# Patient Record
Sex: FEMALE | Race: OTHER | NOT HISPANIC OR LATINO
[De-identification: names, ages, dates, MRNs, and addresses within clinical notes are randomized per-mention and may not be internally consistent; named-entity substitution may affect disease eponyms.]

---

## 2023-03-28 PROBLEM — Z00.00 ENCOUNTER FOR PREVENTIVE HEALTH EXAMINATION: Status: ACTIVE | Noted: 2023-03-28

## 2023-03-30 ENCOUNTER — APPOINTMENT (OUTPATIENT)
Dept: ORTHOPEDIC SURGERY | Facility: CLINIC | Age: 53
End: 2023-03-30
Payer: COMMERCIAL

## 2023-03-30 PROCEDURE — 73562 X-RAY EXAM OF KNEE 3: CPT | Mod: LT

## 2023-03-30 PROCEDURE — 99204 OFFICE O/P NEW MOD 45 MIN: CPT

## 2023-03-30 RX ORDER — MELOXICAM 15 MG/1
15 TABLET ORAL
Qty: 30 | Refills: 1 | Status: ACTIVE | COMMUNITY
Start: 2023-03-30 | End: 1900-01-01
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
## 2023-04-28 ENCOUNTER — APPOINTMENT (OUTPATIENT)
Dept: ORTHOPEDIC SURGERY | Facility: CLINIC | Age: 53
End: 2023-04-28
Payer: COMMERCIAL

## 2023-04-28 PROCEDURE — 99214 OFFICE O/P EST MOD 30 MIN: CPT
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
## 2023-04-28 NOTE — PHYSICAL EXAM
[de-identified] : General: No acute distress, conversant, well-nourished.\par Head: Normocephalic, atraumatic\par Neck: trachea midline, FROM\par Heart: normotensive and normal rate and rhythm\par Lungs: No labored breathing\par Skin: No abrasions, no rashes, no edema\par Psych: Alert and oriented to person, place and time\par Extremities: no peripheral edema or digital cyanosis\par Gait: Normal gait. Can perform tandem gait.  \par Vascular: warm and well perfused distally, palpable distal pulses\par \par Left Knee with no erythema, no effusion.  \par No tenderness to palpation of knee.\par No medial joint line tenderness.\par No lateral joint line tenderness.\par \par Range of motion: 0 - 130 degrees\par No pain with range of motion.\par \par Negative Nusrat's test.\par Stable to varus and valgus stress.\par Negative Lachman's test, negative anterior and posterior drawer tests.  \par \par Sensation intact to light touch.  \par Normal motor exam.\par Warm and well perfused distally. [de-identified] : MRI 1-20-23 : There is a grade 1 injury of the MCL. LCL is unremarkable. There is no Fx bone bruise. Quadriceps and patellar tendons are intact. There is a contusion over the proximal patellar tendon. \par PCL is unremarkable, There is mild diffuse disorganization of the fibers of the ACL. \par Proximal fibula is unremarkable. Sublet subchondral bone bruising is seen anteriorly noted on image 14 of series 8. \par Medial Meniscus is unremarkable. Globular tear is seen perirenal at the anterior horn of the lateral meniscus. There is no baker's cyst. \par \par Left knee 3 view radiographs (3/30/23) no fracture or dislocation. Mild age-related degenerative changes.
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
## 2023-04-28 NOTE — REASON FOR VISIT
[Follow-Up Visit] : a follow-up visit for [Knee Pain] : knee pain [FreeTextEntry2] : PT with relief

## 2023-05-26 ENCOUNTER — APPOINTMENT (OUTPATIENT)
Dept: ORTHOPEDIC SURGERY | Facility: CLINIC | Age: 53
End: 2023-05-26
Payer: COMMERCIAL

## 2023-05-26 PROCEDURE — 99214 OFFICE O/P EST MOD 30 MIN: CPT

## 2023-06-04 NOTE — PHYSICAL EXAM
[de-identified] : General: No acute distress, conversant, well-nourished.\par Head: Normocephalic, atraumatic\par Neck: trachea midline, FROM\par Heart: normotensive and normal rate and rhythm\par Lungs: No labored breathing\par Skin: No abrasions, no rashes, no edema\par Psych: Alert and oriented to person, place and time\par Extremities: no peripheral edema or digital cyanosis\par Gait: Normal gait. Can perform tandem gait.  \par Vascular: warm and well perfused distally, palpable distal pulses\par \par Left Knee with no erythema, no effusion.  \par No tenderness to palpation of knee.\par No medial joint line tenderness.\par No lateral joint line tenderness.\par \par Range of motion: 0 - 130 degrees\par No pain with range of motion.\par \par Negative Nusrat's test.\par Stable to varus and valgus stress.\par Negative Lachman's test, negative anterior and posterior drawer tests.  \par \par Sensation intact to light touch.  \par Normal motor exam.\par Warm and well perfused distally. [de-identified] : MRI 1-20-23 : There is a grade 1 injury of the MCL. LCL is unremarkable. There is no Fx bone bruise. Quadriceps and patellar tendons are intact. There is a contusion over the proximal patellar tendon. \par PCL is unremarkable, There is mild diffuse disorganization of the fibers of the ACL. \par Proximal fibula is unremarkable. Sublet subchondral bone bruising is seen anteriorly noted on image 14 of series 8. \par Medial Meniscus is unremarkable. Globular tear is seen perirenal at the anterior horn of the lateral meniscus. There is no baker's cyst. \par \par Left knee 3 view radiographs (3/30/23) no fracture or dislocation. Mild age-related degenerative changes.
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
## 2023-06-23 ENCOUNTER — APPOINTMENT (OUTPATIENT)
Dept: ORTHOPEDIC SURGERY | Facility: CLINIC | Age: 53
End: 2023-06-23
Payer: COMMERCIAL

## 2023-06-23 PROCEDURE — 99214 OFFICE O/P EST MOD 30 MIN: CPT

## 2023-06-25 NOTE — PHYSICAL EXAM
[de-identified] : General: No acute distress, conversant, well-nourished.\par Head: Normocephalic, atraumatic\par Neck: trachea midline, FROM\par Heart: normotensive and normal rate and rhythm\par Lungs: No labored breathing\par Skin: No abrasions, no rashes, no edema\par Psych: Alert and oriented to person, place and time\par Extremities: no peripheral edema or digital cyanosis\par Gait: Normal gait. Can perform tandem gait.  \par Vascular: warm and well perfused distally, palpable distal pulses\par \par Left Knee with no erythema, no effusion.  \par No tenderness to palpation of knee.\par No medial joint line tenderness.\par No lateral joint line tenderness.\par \par Range of motion: 0 - 130 degrees\par No pain with range of motion.\par \par Negative Nusrat's test.\par Stable to varus and valgus stress.\par Negative Lachman's test, negative anterior and posterior drawer tests.  \par \par Sensation intact to light touch.  \par Normal motor exam.\par Warm and well perfused distally. [de-identified] : MRI 1-20-23 : There is a grade 1 injury of the MCL. LCL is unremarkable. There is no Fx bone bruise. Quadriceps and patellar tendons are intact. There is a contusion over the proximal patellar tendon. \par PCL is unremarkable, There is mild diffuse disorganization of the fibers of the ACL. \par Proximal fibula is unremarkable. Sublet subchondral bone bruising is seen anteriorly noted on image 14 of series 8. \par Medial Meniscus is unremarkable. Globular tear is seen perirenal at the anterior horn of the lateral meniscus. There is no baker's cyst. \par \par Left knee 3 view radiographs (3/30/23) no fracture or dislocation. Mild age-related degenerative changes.
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
## 2023-08-04 ENCOUNTER — APPOINTMENT (OUTPATIENT)
Dept: PHYSICAL MEDICINE AND REHAB | Facility: CLINIC | Age: 53
End: 2023-08-04

## 2023-08-09 ENCOUNTER — APPOINTMENT (OUTPATIENT)
Dept: ORTHOPEDIC SURGERY | Facility: CLINIC | Age: 53
End: 2023-08-09
Payer: COMMERCIAL

## 2023-08-09 DIAGNOSIS — M25.562 PAIN IN LEFT KNEE: ICD-10-CM

## 2023-08-09 PROCEDURE — 99214 OFFICE O/P EST MOD 30 MIN: CPT | Mod: 25

## 2023-08-09 PROCEDURE — 20610 DRAIN/INJ JOINT/BURSA W/O US: CPT | Mod: LT
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
## 2023-08-28 NOTE — PROCEDURE
[de-identified] : Procedure: left knee Joint injection with corticosteroid Pre-Procedure Diagnosis: left knee pain Post-Procedure Diagnosis: same  The patient was educated about the risks and benefits of a corticosteroid injection.  Alternatives were discussed.  The patient understood and consented for the procedure.  The area was sterilely prepped using isopropyl alcohol.  An ethyl chloride spray provided  local anesthesia.  Using the usual sterile technique, 1 ml of 40mg/1ml of Kenalog and 4 ml of Lidocaine 1% without epinephrine was injected into the joint.  A dressing was applied to the area.  The patient tolerated the procedure well and without complication.

## 2023-08-28 NOTE — PHYSICAL EXAM
[de-identified] : General: No acute distress, conversant, well-nourished.\par  Head: Normocephalic, atraumatic\par  Neck: trachea midline, FROM\par  Heart: normotensive and normal rate and rhythm\par  Lungs: No labored breathing\par  Skin: No abrasions, no rashes, no edema\par  Psych: Alert and oriented to person, place and time\par  Extremities: no peripheral edema or digital cyanosis\par  Gait: Normal gait. Can perform tandem gait.  \par  Vascular: warm and well perfused distally, palpable distal pulses\par  \par  Left Knee with no erythema, no effusion.  \par  No tenderness to palpation of knee.\par  No medial joint line tenderness.\par  No lateral joint line tenderness.\par  \par  Range of motion: 0 - 130 degrees\par  No pain with range of motion.\par  \par  Negative Nusrat's test.\par  Stable to varus and valgus stress.\par  Negative Lachman's test, negative anterior and posterior drawer tests.  \par  \par  Sensation intact to light touch.  \par  Normal motor exam.\par  Warm and well perfused distally. [de-identified] : MRI 1-20-23 : There is a grade 1 injury of the MCL. LCL is unremarkable. There is no Fx bone bruise. Quadriceps and patellar tendons are intact. There is a contusion over the proximal patellar tendon. \par  PCL is unremarkable, There is mild diffuse disorganization of the fibers of the ACL. \par  Proximal fibula is unremarkable. Sublet subchondral bone bruising is seen anteriorly noted on image 14 of series 8. \par  Medial Meniscus is unremarkable. Globular tear is seen perirenal at the anterior horn of the lateral meniscus. There is no baker's cyst. \par  \par  Left knee 3 view radiographs (3/30/23) no fracture or dislocation. Mild age-related degenerative changes.

## 2023-09-14 ENCOUNTER — APPOINTMENT (OUTPATIENT)
Dept: ORTHOPEDIC SURGERY | Facility: CLINIC | Age: 53
End: 2023-09-14